# Patient Record
Sex: FEMALE | Race: WHITE | NOT HISPANIC OR LATINO | URBAN - METROPOLITAN AREA
[De-identification: names, ages, dates, MRNs, and addresses within clinical notes are randomized per-mention and may not be internally consistent; named-entity substitution may affect disease eponyms.]

---
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
## 2024-02-05 ENCOUNTER — APPOINTMENT (OUTPATIENT)
Dept: ORTHOPEDIC SURGERY | Facility: CLINIC | Age: 19
End: 2024-02-05
Payer: COMMERCIAL

## 2024-02-05 VITALS — WEIGHT: 140 LBS | HEIGHT: 65 IN | BODY MASS INDEX: 23.32 KG/M2

## 2024-02-05 DIAGNOSIS — M54.50 LOW BACK PAIN, UNSPECIFIED: ICD-10-CM

## 2024-02-05 PROCEDURE — 73502 X-RAY EXAM HIP UNI 2-3 VIEWS: CPT

## 2024-02-05 PROCEDURE — 72100 X-RAY EXAM L-S SPINE 2/3 VWS: CPT

## 2024-02-05 PROCEDURE — 99213 OFFICE O/P EST LOW 20 MIN: CPT

## 2024-02-05 NOTE — ASSESSMENT
[FreeTextEntry1] : she has chronic hip pain with recent exacerbation, has failed prior therapy  will get hip mri evaluate labral tear  f/u after mri

## 2024-02-05 NOTE — HISTORY OF PRESENT ILLNESS
[Intermittent] : intermittent [de-identified] :  02/05/2024:  New patient here for right hip pain. hx of hip pan since she was 7 years old. Has history of snapping hip, joint laxity. She also notes history of lumbar issues. states she was scheduled by pain mgt for lesi last summer but cancelled because they did not send pain meds prior to the procedure. Present complaints  Was lifting at the gym  3 weeks ago, pain in the right hip/groin after lift just never went away. Hurts when she externally rotates. 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

## 2024-02-05 NOTE — PHYSICAL EXAM
[Right] : right hip [5___] : adduction 5[unfilled]/5 [] : not mildly antalgic [de-identified] : click noted with rotation

## 2024-02-09 ENCOUNTER — RESULT REVIEW (OUTPATIENT)
Age: 19
End: 2024-02-09

## 2024-02-09 ENCOUNTER — APPOINTMENT (OUTPATIENT)
Dept: MRI IMAGING | Facility: CLINIC | Age: 19
End: 2024-02-09

## 2024-02-16 ENCOUNTER — APPOINTMENT (OUTPATIENT)
Dept: ORTHOPEDIC SURGERY | Facility: CLINIC | Age: 19
End: 2024-02-16
Payer: COMMERCIAL

## 2024-02-16 DIAGNOSIS — M24.851 OTHER SPECIFIC JOINT DERANGEMENTS OF RIGHT HIP, NOT ELSEWHERE CLASSIFIED: ICD-10-CM

## 2024-02-16 PROCEDURE — 99213 OFFICE O/P EST LOW 20 MIN: CPT

## 2024-02-16 NOTE — PHYSICAL EXAM
[Right] : right hip [5___] : adduction 5[unfilled]/5 [] : not mildly antalgic [de-identified] : click noted with rotation

## 2024-02-16 NOTE — HISTORY OF PRESENT ILLNESS
[Cough] : cough [___ Weeks ago] :  [unfilled] weeks ago [Congestion] : congestion [Wheezing] : no wheezing [Shortness Of Breath] : no shortness of breath [Earache] : no earache [de-identified] :  02/05/2024:  New patient here for right hip pain. hx of hip pan since she was 7 years old. Has history of snapping hip, joint laxity. She also notes history of lumbar issues. states she was scheduled by pain mgt for lesi last summer but cancelled because they did not send pain meds prior to the procedure. Present complaints  Was lifting at the gym  3 weeks ago, pain in the right hip/groin after lift just never went away. Hurts when she externally rotates. 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

## 2024-04-19 ENCOUNTER — APPOINTMENT (OUTPATIENT)
Dept: ORTHOPEDIC SURGERY | Facility: CLINIC | Age: 19
End: 2024-04-19
Payer: COMMERCIAL

## 2024-04-19 VITALS — HEIGHT: 65 IN | BODY MASS INDEX: 23.32 KG/M2 | WEIGHT: 140 LBS

## 2024-04-19 DIAGNOSIS — S76.011A STRAIN OF MUSCLE, FASCIA AND TENDON OF RIGHT HIP, INITIAL ENCOUNTER: ICD-10-CM

## 2024-04-19 DIAGNOSIS — M51.26 OTHER INTERVERTEBRAL DISC DISPLACEMENT, LUMBAR REGION: ICD-10-CM

## 2024-04-19 PROCEDURE — 99213 OFFICE O/P EST LOW 20 MIN: CPT

## 2024-07-08 ENCOUNTER — APPOINTMENT (OUTPATIENT)
Dept: ORTHOPEDIC SURGERY | Facility: CLINIC | Age: 19
End: 2024-07-08
Payer: COMMERCIAL

## 2024-07-08 ENCOUNTER — NON-APPOINTMENT (OUTPATIENT)
Age: 19
End: 2024-07-08

## 2024-07-08 VITALS
BODY MASS INDEX: 23.32 KG/M2 | HEART RATE: 91 BPM | DIASTOLIC BLOOD PRESSURE: 80 MMHG | SYSTOLIC BLOOD PRESSURE: 118 MMHG | HEIGHT: 65 IN | WEIGHT: 140 LBS

## 2024-07-08 PROCEDURE — 99204 OFFICE O/P NEW MOD 45 MIN: CPT

## 2024-07-08 PROCEDURE — 73522 X-RAY EXAM HIPS BI 3-4 VIEWS: CPT

## 2024-07-10 ENCOUNTER — RESULT REVIEW (OUTPATIENT)
Age: 19
End: 2024-07-10

## 2024-07-11 ENCOUNTER — OUTPATIENT (OUTPATIENT)
Dept: OUTPATIENT SERVICES | Facility: HOSPITAL | Age: 19
LOS: 1 days | End: 2024-07-11

## 2024-07-11 ENCOUNTER — APPOINTMENT (OUTPATIENT)
Dept: INTERVENTIONAL RADIOLOGY/VASCULAR | Facility: CLINIC | Age: 19
End: 2024-07-11
Payer: COMMERCIAL

## 2024-07-11 DIAGNOSIS — M25.551 PAIN IN RIGHT HIP: ICD-10-CM

## 2024-07-11 PROCEDURE — 27093 INJECTION FOR HIP X-RAY: CPT | Mod: RT

## 2024-07-11 PROCEDURE — 73525 CONTRAST X-RAY OF HIP: CPT | Mod: 26,RT

## 2024-07-16 ENCOUNTER — APPOINTMENT (OUTPATIENT)
Dept: ORTHOPEDIC SURGERY | Facility: CLINIC | Age: 19
End: 2024-07-16
Payer: COMMERCIAL

## 2024-07-16 DIAGNOSIS — M25.551 PAIN IN RIGHT HIP: ICD-10-CM

## 2024-07-16 PROCEDURE — 99214 OFFICE O/P EST MOD 30 MIN: CPT

## 2024-07-30 ENCOUNTER — APPOINTMENT (OUTPATIENT)
Dept: ORTHOPEDIC SURGERY | Facility: CLINIC | Age: 19
End: 2024-07-30
Payer: COMMERCIAL

## 2024-07-30 VITALS
DIASTOLIC BLOOD PRESSURE: 77 MMHG | HEART RATE: 85 BPM | SYSTOLIC BLOOD PRESSURE: 117 MMHG | HEIGHT: 65 IN | BODY MASS INDEX: 23.32 KG/M2 | WEIGHT: 140 LBS

## 2024-07-30 PROCEDURE — 99214 OFFICE O/P EST MOD 30 MIN: CPT

## 2024-07-30 NOTE — HISTORY OF PRESENT ILLNESS
[de-identified] : 7/30/24: Patient here for follow-up right hip pain.  States she does have back pain that is worse in the lower area of her back.  Does occasionally have pain that radiates down her leg.  Does get some numbness and tingling at times.  Denies red flag symptoms.  Did previously see an orthopedist for this and was doing physical therapy which does provide some relief.  Taking Aleve for the pain.  Does feel this is limiting her.  7/16/24: Patient here for follow-up right hip pain.  States steroid injection felt great for a few hours but then felt worse afterwards.  Has been doing PT for months but does not feel like pain is going away.  Despite the pain is limiting her.  Pain is mostly deep groin area.  Feels it is hard to stand for long periods of time at her job.  Feels the pain has gotten worse from a few months ago.  7/8/24: Patient presents with hip knee pain right worse than left.  Is been on for many years.  She grope dancing.  Has been seeing another orthopedist who recommended physical therapy.  It helps somewhat but it does not last.  Tried taking Advil which does not provide much relief.  Occasionally gets pain that radiates to her knees but is mostly deep in her hip.  Denies numbness tingling.  Does feel her hip snaps at times.  PMH-asthma, PCOS

## 2024-07-30 NOTE — DISCUSSION/SUMMARY
[de-identified] : Lumbar radiculopathy  Extensive discussion of the natural history of this issue was had with the patient.  We discussed the treatment options focusing on conservative therapy which includes anti-inflammatories, physical therapy/home exercise, & activity modification.   A prescription for meloxicam with the appropriate warnings for GI, cardiac, and renal side effects was given to the patient.  Patient was instructed not to use any other NSAIDs with this medication. Recommend patient follow-up with PM&R to discuss further treatments for her lumbar spine.  The patient's current medication management of their orthopedic diagnosis was reviewed today: (1) We discussed a comprehensive treatment plan that included possible pharmaceutical management involving the use of prescription strength medications including but not limited to options such as oral Ibuprofen 400mg QID, once daily Meloxicam 15 mg, or 500-650 mg Tylenol versus over the counter oral medications and topical prescription NSAID Pennsaid vs over the counter Voltaren gel. (2) There is a moderate risk of morbidity with further treatment, especially from use of prescription strength medications and possible side effects of these medications which include upset stomach with oral medications, skin reactions to topical medications and cardiac/renal issues with long term use. (3) I recommended that the patient follow-up with their medical physician to discuss any significant specific potential issues with long term medication use such as interactions with current medications or with exacerbation of underlying medical comorbidities. (4) The benefits and risks associated with use of injectable, oral or topical, prescription and over the counter anti-inflammatory medications were discussed with the patient. The patient voiced understanding of the risks including but not limited to bleeding, stroke, kidney dysfunction, heart disease, and were referred to the black box warning label for further information.

## 2024-07-30 NOTE — PHYSICAL EXAM
[de-identified] : 7/30/24: MRI right hip 7/17/2024-mild right gluteus minimus tendinosis, disc degeneration at lumbosacral junction Prior lumbar x-rays at [facility] and MRIs of lumbar spine at [facility] [place] reviewed-degenerative disc disease around L5-S1 with stenosis 7/8/24: AP pelvis lateral bilateral hips-joint space well-preserved, no abnormalities MRI right hip 2/9/2024-small bilateral hip joint effusions, exam otherwise unremarkable [de-identified] : Bilateral hips Pain with resisted hip flexion, adduction, knee flexion on right No pain with resisted abduction Pain with FADIR right more than left  Lumbar spine Palpation: Tender to palpation at paraspinal muscles Motor: 5/5 L2-S1 Sensory: 2/2 L2-S1 Straight leg raise: Positive bilaterally Clonus: < 5 beats

## 2024-08-09 ENCOUNTER — APPOINTMENT (OUTPATIENT)
Dept: PHYSICAL MEDICINE AND REHAB | Facility: CLINIC | Age: 19
End: 2024-08-09

## 2024-08-09 PROBLEM — Z87.39 HISTORY OF SCOLIOSIS: Status: RESOLVED | Noted: 2024-08-09 | Resolved: 2024-08-09

## 2024-08-09 PROBLEM — E28.2 PCOS (POLYCYSTIC OVARIAN SYNDROME): Status: RESOLVED | Noted: 2024-08-09 | Resolved: 2024-08-09

## 2024-08-09 PROCEDURE — 99204 OFFICE O/P NEW MOD 45 MIN: CPT | Mod: GC

## 2024-08-09 PROCEDURE — G2211 COMPLEX E/M VISIT ADD ON: CPT | Mod: NC

## 2024-08-09 RX ORDER — MELOXICAM 15 MG/1
15 TABLET ORAL DAILY
Qty: 30 | Refills: 1 | Status: COMPLETED | COMMUNITY
Start: 2024-07-30 | End: 2024-08-09

## 2024-08-09 NOTE — PHYSICAL EXAM
[FreeTextEntry1] : PE: Constitutional: In NAD, calm and cooperative MSK (Back)  Inspection: no gross swelling identified  Palpation: Tenderness of the bilateral lower lumbar paraspinals  ROM: Pain at end lumbar extension=flexion  Strength: 5/5 strength in bilateral lower extremities  Reflexes: 2+ Patella reflex bilaterally, 2+ Achilles reflex bilaterally, negative clonus bilaterally  Sensation: Intact to light touch in bilateral lower extremities Special tests: SLR: positive bilaterally GERARDO: negative bilaterally FADIR: negative bilaterally Facet loading: negative bilaterally

## 2024-08-09 NOTE — DATA REVIEWED
[FreeTextEntry1] : MR L Spine 2023 ZP rad reviewed and interpreted by me: L4-5 disc herniation seen with impingement of L5 nerve roots  Patient Name: Nataliya West Patient Phone Number: [phone] Patient ID: [id number] : 2005 Date of Exam: 06/15/2023 R. Phys. Name: Joseph Serna R. Phys. Address: [address] [place] [address], [facility] R. Phys. Phone: [phone] MRI-LUMBAR SPINE NON CONTRAST  HISTORY: M54.41 Lower back pain with right sciatica R20.2 Upper and Lower Extremity Pins and Needles M54.42 Lower back pain with left sciatica M62.830 Spasm of back muscles R20.0 Numbness Lower/Upper Extremity  TECHNIQUE: MR imaging of the lumbar spine was performed without contrast on a 3.0 Carolyn high-field wide-bore magnet.  COMPARISON: MRI lumbar spine MRI dated 2019  FINDINGS:  SEGMENTATION: Normal.  ALIGNMENT: Minimal thoracolumbar dextroscoliosis.  BONES: Vertebral body heights are maintained. Marrow signal is within normal limits.  CONUS: Normal in signal and morphology.  CANAL: No congenital narrowing of the spinal canal.  DISCS: See details below.  L1-L2: There is no disc bulge, herniation, or stenosis.  L2-L3: There is no disc bulge, herniation, or stenosis.  L3-L4: There is no disc bulge, herniation, or stenosis.  L4-L5: There is a progressive right paracentral disc herniation with annular tear impressing upon the thecal sac resulting in mild to moderate central stenosis. There is lateral recess stenosis, worse on the right with impingement of the right L5 nerve roots. The neural foramina are patent.  L5-S1: There is no disc bulge, herniation, or stenosis.  The visualized abdominal and pelvic structures are unremarkable.  IMPRESSION:   L4-L5: There is a progressive right paracentral disc herniation with annular tear impressing upon the thecal sac resulting in mild to moderate central stenosis. There is lateral recess stenosis, worse on the right with impingement of the right L5 nerve roots. The neural foramina are patent.  Signed by: Dio Carver MD Signed Date: 2023 1:26 PM EDT    SIGNED BY: Dio Carver M.D., Ext. [phone] 01:26 PM

## 2024-08-09 NOTE — HISTORY OF PRESENT ILLNESS
[FreeTextEntry1] : Ms. YOLI DYER is a 19-year-old female who presents with low back pain.   Location: bilateral lower back pain Onset: pain since 2018 and worsened in Jan 2024 Provocation/Palliative: Lifting, standing, walking worsen pain, better with laying down Quality: aching Radiation:  intermittently radiates into foot R>L to the bottom of the foot  Severity: moderate pain that can increase to severe after long hours of work Timing: constant   Intermittent feet numbness R>L. Denies any associated leg weakness. Denies any loss of bowel/bladder control or any groin numbness. Previous medications trialed: Aleve, tylenol, advil, meloxicam, on steroids intermittently for asthma/allergies Previous procedures relevant to complaint: none Conservative therapy tried?: still in PT, doing aquatic therapies, chiropractor but not improved

## 2024-08-09 NOTE — ASSESSMENT
- Hold BP meds due to septic shock  - Will resume medication as indicated   [FreeTextEntry1] : Ms. Nataliya West is a 19 year old female presenting for worsening chronic lower back pain. Patient's been consistent with lumbar radiculopathy of the L5 nerve roots. Denies any red flag signs.  Will recommend: -2023 L spine Mri reviewed -continue aquatic PT -ordered L spine Mri since pain worsened after most recent Mri with concern of possible worsening of prior L disc herniation -prescription for Celebrex 100mg, 1 tablet 1-2 times daily as needed for lower back pain.  Patient advised on cardiac/gi/renal side effects. Patient encouraged to take medication with food and not with other NSAIDs.    Return after MRI. Patient aware of red flag signs including any changes to their bowel/bladder control, groin numbness or new weakness. Patient knows to seek immediate attention by calling 911 or going to nearest ER if these symptoms appear.  This patient is being managed for a complex chronic pain that requires ongoing medical management. The nature of this condition requires a longitudinal relationship and monitoring over time for appropriate treatment.

## 2024-08-21 ENCOUNTER — APPOINTMENT (OUTPATIENT)
Dept: PHYSICAL MEDICINE AND REHAB | Facility: CLINIC | Age: 19
End: 2024-08-21
Payer: COMMERCIAL

## 2024-08-21 VITALS
WEIGHT: 140 LBS | RESPIRATION RATE: 15 BRPM | HEART RATE: 80 BPM | DIASTOLIC BLOOD PRESSURE: 80 MMHG | BODY MASS INDEX: 23.32 KG/M2 | HEIGHT: 65 IN | SYSTOLIC BLOOD PRESSURE: 127 MMHG

## 2024-08-21 DIAGNOSIS — M54.16 RADICULOPATHY, LUMBAR REGION: ICD-10-CM

## 2024-08-21 DIAGNOSIS — M51.26 OTHER INTERVERTEBRAL DISC DISPLACEMENT, LUMBAR REGION: ICD-10-CM

## 2024-08-21 PROCEDURE — 99214 OFFICE O/P EST MOD 30 MIN: CPT

## 2024-08-21 PROCEDURE — G2211 COMPLEX E/M VISIT ADD ON: CPT | Mod: NC

## 2024-08-21 NOTE — ASSESSMENT
[FreeTextEntry1] : Ms. Nataliya West is a 19 year old female presenting for worsening chronic lower back pain. Patient's been consistent with lumbar radiculopathy of the L5 nerve roots as confirmed on new MRI L Spine showing worsening herniation. Denies any red flag signs. Will recommend: - Discussed with patient the risks (including but not limited to bleeding, elevated blood sugar, allergic reaction, infection, nerve damage, etc), benefits and alternatives to an epidural steroid injection for which patient understands and would like to proceed. Will plan on a bilateral L4-5 TFESI.  -continue aquatic PT   Return for procedure. Patient aware of red flag signs including any changes to their bowel/bladder control, groin numbness or new weakness. Patient knows to seek immediate attention by calling 911 or going to nearest ER if these symptoms appear.  This patient is being managed for a complex chronic pain that requires ongoing medical management. The nature of this condition requires a longitudinal relationship and monitoring over time for appropriate treatment

## 2024-08-21 NOTE — PHYSICAL EXAM
[FreeTextEntry1] : PE: Constitutional: In NAD, calm and cooperative MSK (Back)  Inspection: no gross swelling identified  Palpation: Tenderness of the bilateral lower lumbar paraspinals  ROM: Pain at end lumbar extension=flexion  Strength: 5/5 strength in bilateral lower extremities  Reflexes: 2+ Patella reflex bilaterally, 2+ Achilles reflex bilaterally, negative clonus bilaterally  Sensation: Intact to light touch in bilateral lower extremities Special tests: SLR: positive bilaterally GERARDO: negative bilaterally FADIR: negative bilaterally Facet loading: negative bilaterally.

## 2024-08-21 NOTE — HISTORY OF PRESENT ILLNESS
[FreeTextEntry1] : Ms. YOLI DYER is a 19 year old female who presents for follow up. At last visit, she was continued on Aquatic PT, ordered a new MRI L spine and given Celebrex. She says the pain has been getting worse.  She says the celebrex did not help too much.    Location: bilateral lower back pain Onset: pain since 2018 and worsened in Jan 2024 Provocation/Palliative: Lifting, standing, walking worsen pain, better with laying down Quality: aching Radiation: intermittently radiates into foot R>L to the bottom of the foot Severity: moderate pain that can increase to severe after long hours of work Timing: constant  No bowel/bladder changes. No groin numbness.

## 2024-08-21 NOTE — DATA REVIEWED
[FreeTextEntry1] : A1C 2023 reviewed CMP 2023 reviewed  Patient Name: Nataliya West Patient Phone Number: [phone] Patient ID: [id number] : 2005 Date of Exam: 2024 R. Phys. Name: Akshat Cid RRuth PhysRuth Address: [address] [place] [address] R. Phys. Phone: [phone] EXAM: MRI-LUMBAR SPINE NON CONTRAST  HISTORY: M54.41 Lower back pain with right sciatica   TECHNIQUE: Axial and sagittal multi-weighted images of the lumbar spine were obtained on a 1.5 Carolyn magnet.  COMPARISON: 6.15.23.  FINDINGS:  There is no fracture, bone marrow edema or suspicious marrow replacing lesion.  Spinal alignment is normal.  Disc desiccation at L4-L5. The remaining intervertebral discs are normal in height and signal.  The conus medullaris terminates at L1.  L1-L2: No disc bulge, spinal canal or foraminal stenosis. L2-L3: No disc bulge, spinal canal or foraminal stenosis L3-L4: No disc bulge, spinal canal or foraminal stenosis L4-L5: Disc bulge with right paracentral annular fissure and disc protrusion. Moderate right greater than left lateral recess narrowing with impingement of descending L5 nerve roots, worsened from prior. No foraminal narrowing. L5-S1: No disc bulge, spinal canal or foraminal narrowing  IMPRESSION:  L4-L5: Disc bulge with right paracentral annular fissure and disc protrusion. Moderate right greater than left lateral recess narrowing with impingement of descending L5 nerve roots, worsened from prior.  No fracture  Signed by: Piotr Blandon Signed Date: 2024 1:15 PM EDT    SIGNED BY: Piotr Blandon M.D., Ext. [phone] 01:15 PM

## 2024-09-05 ENCOUNTER — APPOINTMENT (OUTPATIENT)
Dept: PHYSICAL MEDICINE AND REHAB | Facility: AMBULATORY SURGERY CENTER | Age: 19
End: 2024-09-05
Payer: COMMERCIAL

## 2024-09-05 PROCEDURE — 64483 NJX AA&/STRD TFRM EPI L/S 1: CPT | Mod: 50

## 2024-09-05 NOTE — PROCEDURE
[de-identified] : Lumbar Transforaminal Epidural Steroid Injection Under Fluoroscopic Guidance    Attending: Akshat Cid DO  PREOPERATIVE DIAGNOSIS: Lumbar Radiculopathy POSTOPERATIVE DIAGNOSIS: same  SEDATION: As per Anesthesia   PROCEDURE PERFORMED:  Lumbar Transforaminal Epidural Steroid Injection at the bilateral L4-5 level   ESTIMATED BLOOD LOSS:  None FLUOROSCOPY WAS USED.    PROCEDURE AND FINDINGS:   Patient was greeted in the pre-procedure holding area. The risk, benefits and alternatives to the procedure were again reviewed with the patient and written informed consent was placed in the chart. They were taken to the procedure room and positioned prone on the fluoroscopy table.  Prior to the procedure a time out was completed, verifying correct patient, procedure, and site.     An AP fluoroscopic  film was taken to identify the vertebral bodies, pedicles and superior articulating processes of interest. The fluoroscope was then obliqued ipsilaterally until the foramen was optimally visualized. The skin was prepped with chlorhexidine and draped in the usual sterile fashion. The skin and subcutaneous tissue overlying the aforementioned anatomic targets were anesthetized using a 27-gauge 1-1/2 inch needle with 1% preservative-free lidocaine for a total volume of 6 mls.     Then a 22-gauge 3.5inch Quincke spinal needle was advanced under fluoroscopic guidance using an oblique view just inferior to the pedicle(s) of interest. Then, utilizing AP and lateral fluoroscopic views, the position of the needle tip was confirmed to be within the neural foramen. After negative aspiration, 2 mls of contrast was injected under AP view at each level and confirmed adequate spread along the nerve root and in the epidural space. There was no evidence of intravascular uptake or intrathecal spread on imaging.    At this point, after negative aspiration, a total of 2mL volume of treatment injectate, consisting of 0.5mL of dexamethasone 10mg/mL, and 1.5 mL of Preservative Free Normal Saline was injected easily.  The needle was then flushed with 1 ml of saline and removed. The needle insertion site was dressed appropriately.    Patient was taken to the recovery room where they were monitored for a brief period of time. They tolerated the procedure well and were discharged home in stable condition with post procedural instructions. Patient was instructed to follow up in clinic in 2 weeks.

## 2024-09-13 ENCOUNTER — APPOINTMENT (OUTPATIENT)
Dept: PHYSICAL MEDICINE AND REHAB | Facility: CLINIC | Age: 19
End: 2024-09-13
Payer: COMMERCIAL

## 2024-09-13 VITALS
DIASTOLIC BLOOD PRESSURE: 76 MMHG | WEIGHT: 140 LBS | SYSTOLIC BLOOD PRESSURE: 115 MMHG | RESPIRATION RATE: 15 BRPM | BODY MASS INDEX: 23.9 KG/M2 | HEIGHT: 64 IN | HEART RATE: 82 BPM

## 2024-09-13 PROCEDURE — G2211 COMPLEX E/M VISIT ADD ON: CPT | Mod: NC

## 2024-09-13 PROCEDURE — 99214 OFFICE O/P EST MOD 30 MIN: CPT

## 2024-09-13 NOTE — PHYSICAL EXAM
[FreeTextEntry1] : PE: Constitutional: In NAD, calm and cooperative MSK (Back/hips)  Inspection: no gross swelling identified, no swelling or erythema around injection sites  Palpation: No significant tenderness of the bilateral lower lumbar paraspinals, mild TTP over R>L GTB  ROM: Pain at end lumbar flexion>extension and with R>L hip ER>IR  Strength: 5/5 strength in bilateral lower extremities  Reflexes: 2+ Patella reflex bilaterally, 2+ Achilles reflex bilaterally, negative clonus bilaterally  Sensation: Intact to light touch in bilateral lower extremities Special tests: SLR: positive bilaterally GERARDO: positive bilaterally FADIR: negative bilaterally Facet loading: negative bilaterally.

## 2024-09-13 NOTE — ASSESSMENT
[FreeTextEntry1] : Ms. Nataliya West is a 19 year old female presenting for worsening chronic lower back pain. Patient's been consistent with lumbar radiculopathy of the L5 nerve roots as confirmed on new MRI L Spine showing worsening herniation. She has a bilateral L4-5 TFESI without significant relief. Denies any red flag signs. Will recommend: - Discussed with patient the risks (including but not limited to bleeding, elevated blood sugar, allergic reaction, infection, nerve damage, etc), benefits and alternatives to a repeat epidural steroid injection for which patient understands but would like to hold off for now. - Will refer patient to Ortho Spine given persistent radicular pain despite BEBETO -continue aquatic PT - Patient deferred other oral medications at t his time - Patient to get another opinion for her hips as well  Return after Ortho evaluations. Patient aware of red flag signs including any changes to their bowel/bladder control, groin numbness or new weakness. Patient knows to seek immediate attention by calling 911 or going to nearest ER if these symptoms appear.  This patient is being managed for a complex chronic pain that requires ongoing medical management. The nature of this condition requires a longitudinal relationship and monitoring over time for appropriate treatment.

## 2024-09-13 NOTE — HISTORY OF PRESENT ILLNESS
[FreeTextEntry1] : Ms. YOLI DYER is a 19 year old female who presents for follow up. At last visit, she was ordered a bilateral L4-5 TFESI for which she had done on 9/5/24. She says she only got about a day worth of relief from the procedure until pain came back. Pain is the same as before. She is also complaining of bilateral hip pain, R>L, again similar to prior.    Location: bilateral lower back pain, bilateral hips R>L Onset: pain since 2018 and worsened in Jan 2024, no inciting events Provocation/Palliative: Lifting, standing, walking worsen pain, better with laying down Quality: aching Radiation: intermittently radiates into foot R>L to the bottom of the foot Severity: moderate pain that can increase to severe after long hours of work Timing: constant   No bowel/bladder changes. No groin numbness.

## 2024-09-19 ENCOUNTER — APPOINTMENT (OUTPATIENT)
Dept: ORTHOPEDIC SURGERY | Facility: CLINIC | Age: 19
End: 2024-09-19
Payer: COMMERCIAL

## 2024-09-19 VITALS
WEIGHT: 140 LBS | SYSTOLIC BLOOD PRESSURE: 114 MMHG | HEART RATE: 84 BPM | DIASTOLIC BLOOD PRESSURE: 77 MMHG | BODY MASS INDEX: 23.9 KG/M2 | HEIGHT: 64 IN

## 2024-09-19 DIAGNOSIS — S76.011A STRAIN OF MUSCLE, FASCIA AND TENDON OF RIGHT HIP, INITIAL ENCOUNTER: ICD-10-CM

## 2024-09-19 DIAGNOSIS — M25.551 PAIN IN RIGHT HIP: ICD-10-CM

## 2024-09-19 DIAGNOSIS — M24.851 OTHER SPECIFIC JOINT DERANGEMENTS OF RIGHT HIP, NOT ELSEWHERE CLASSIFIED: ICD-10-CM

## 2024-09-19 DIAGNOSIS — M54.16 RADICULOPATHY, LUMBAR REGION: ICD-10-CM

## 2024-09-19 DIAGNOSIS — M51.26 OTHER INTERVERTEBRAL DISC DISPLACEMENT, LUMBAR REGION: ICD-10-CM

## 2024-09-19 PROCEDURE — 99204 OFFICE O/P NEW MOD 45 MIN: CPT

## 2024-09-19 PROCEDURE — 72100 X-RAY EXAM L-S SPINE 2/3 VWS: CPT

## 2024-09-19 PROCEDURE — 99214 OFFICE O/P EST MOD 30 MIN: CPT | Mod: 25

## 2024-09-19 PROCEDURE — 99214 OFFICE O/P EST MOD 30 MIN: CPT

## 2024-09-19 RX ORDER — OMEPRAZOLE 40 MG/1
40 CAPSULE, DELAYED RELEASE ORAL
Refills: 0 | Status: ACTIVE | COMMUNITY

## 2024-09-19 RX ORDER — DUPILUMAB 300 MG/2ML
300 INJECTION, SOLUTION SUBCUTANEOUS
Refills: 0 | Status: ACTIVE | COMMUNITY

## 2024-09-19 RX ORDER — FEXOFENADINE HYDROCHLORIDE 180 MG/1
180 TABLET ORAL
Refills: 0 | Status: ACTIVE | COMMUNITY

## 2024-09-19 RX ORDER — HYDROXYZINE HYDROCHLORIDE 25 MG/1
25 TABLET ORAL
Refills: 0 | Status: ACTIVE | COMMUNITY

## 2024-09-19 RX ORDER — FLUTICASONE FUROATE, UMECLIDINIUM BROMIDE AND VILANTEROL TRIFENATATE 200; 62.5; 25 UG/1; UG/1; UG/1
200-62.5-25 POWDER RESPIRATORY (INHALATION)
Refills: 0 | Status: ACTIVE | COMMUNITY

## 2024-09-19 NOTE — ADDENDUM
[FreeTextEntry1] : Documented by Anaid Pascal acting as a scribe for Dr. Reeves and Yamil Baker PA-C on 09/19/2024. All medical record entries made by the Scribe were at my, Dr. Reeves, and Yamil Baker's, direction and personally dictated by me on 09/19/2024. I have reviewed the chart and agree that the record accurately reflects my personal performance of the history, physical exam, procedure and imaging.

## 2024-09-19 NOTE — DISCUSSION/SUMMARY
[de-identified] : We had a thorough discussion regarding the nature of her pain, the pathophysiology, as well as all treatment options. I discussed operative and non-operative treatment modalities. Recommend patient follow up with a rheumatologist to reassess her symptoms. Considering the patient's current presentation of pain, physical exam, and radiographs an MRI arthrogram is indicated at this time. A prescription for this was given to the patient. We will go over the MRI arthrogram results with her upon obtaining the results in the office and advise her of further treatment options. She agrees with the above plan and all questions were answered.

## 2024-09-19 NOTE — PHYSICAL EXAM
[de-identified] : General: Well appearing, no acute distress Neurologic: A&Ox3, No focal deficits Head: NCAT without abrasions, lacerations, or ecchymosis to head, face, or scalp Eyes: No scleral icterus, no gross abnormalities Respiratory: Equal chest wall expansion bilaterally, no accessory muscle use Lymphatic: No lymphadenopathy palpated Skin: Warm and dry Psychiatric: Normal mood and affect  Examination of the Right hip reveals no obvious deformity or leg length discrepancy. There is no swelling noted. The patient is nontender to palpation over the greater trochanter, groin, and IT band. The patients range of motion is to 110 degrees of hip flexion, 40 degrees of abduction, 40 degrees of abduction, 20 degrees of adduction, and 35 degrees of external rotation causes palpable click likely over the iliopsoas.  Internal rotations deficit b/l. The patient has 5/5 strength to resisted hip flexion, abduction and adduction. The patient has anterior hip pain with GERARDO and positive FADIR Test. Positive Lyman Test. Positive resisted SLR test at 30 degrees of hip flexion (Stinchfield). Negative Piriformis Test. No SI joint instability. There is no pain with logrolling. The calf and thigh are soft and nontender bilaterally. The patient is grossly neurovascularly intact distally.   Examination of the Left hip reveals no obvious deformity or leg length discrepancy. There is no swelling noted. The patient is nontender to palpation over the greater trochanter, groin, and IT band. The patients range of motion is to 110 degrees of hip flexion, 40 degrees of abduction, 40 degrees of abduction, 20 degrees of adduction, and 35 degrees of external rotation. Internal rotations deficit b/l. Patient has 5/5 strength to resisted hip flexion, abduction and adduction. The patient has a negative GERARDO and positive FADIR Test. Positive Lyman Test. Positive resisted SLR test at 30 degrees of hip flexion (Stinchfield). Negative Piriformis Test. No SI joint instability. There is no pain with logrolling. The calf and thigh are soft and nontender bilaterally. The patient is grossly neurovascularly intact distally.  [de-identified] : MRI [facility] Date of Exam: 07/17/2024 MRI-RIGHT HIP NON CONTRAST  IMPRESSION: Mild right gluteus minimus tendinosis, without tears.  Disc desiccation/degeneration of the intervertebral discs at the lumbosacral junction.  Date of Exam: 08/14/2024 EXAM: MRI-LUMBAR SPINE NON CONTRAST  IMPRESSION: L4-L5: Disc bulge with right paracentral annular fissure and disc protrusion. Moderate right greater than left lateral recess narrowing with impingement of descending L5 nerve roots, worsened from prior.  No fracture

## 2024-09-19 NOTE — HISTORY OF PRESENT ILLNESS
[de-identified] : YOLI DYER is a 19 year old female being seen for bilateral hip pain R>>L. She states she has been seeing Dr. Cid and Dr. Muller for her back pain and she has seen Dr. Gore for her hip pain. She states she has been experiencing hip pain since January 2024. 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 She complains of pain and tightness in her R hip. She reports she feels pain in the lateral aspect of her right hip and states it radiates into her groin. States left hip is incomparable to her R hip pain and symptoms. She reports having epidural injection in her lumbar spine and an intra-articular injection into her right hip. She states neither injection provided her with any relief of symptoms. She presents today with MRIs of her lumbar spine and right hip from [facility]. Of note, states that she was following with an allergist who recommended her take Dupixent. Her first injection was yesterday.

## 2024-09-19 NOTE — CONSULT LETTER
[Dear  ___] : Dear  [unfilled], [Consult Letter:] : I had the pleasure of evaluating your patient, [unfilled]. [Please see my note below.] : Please see my note below. [Sincerely,] : Sincerely, [FreeTextEntry3] : Dr. Camilo Reeves

## 2024-09-19 NOTE — PHYSICAL EXAM
[de-identified] : General: Well appearing, no acute distress Neurologic: A&Ox3, No focal deficits Head: NCAT without abrasions, lacerations, or ecchymosis to head, face, or scalp Eyes: No scleral icterus, no gross abnormalities Respiratory: Equal chest wall expansion bilaterally, no accessory muscle use Lymphatic: No lymphadenopathy palpated Skin: Warm and dry Psychiatric: Normal mood and affect  Examination of the Right hip reveals no obvious deformity or leg length discrepancy. There is no swelling noted. The patient is nontender to palpation over the greater trochanter, groin, and IT band. The patients range of motion is to 110 degrees of hip flexion, 40 degrees of abduction, 40 degrees of abduction, 20 degrees of adduction, and 35 degrees of external rotation causes palpable click likely over the iliopsoas.  Internal rotations deficit b/l. The patient has 5/5 strength to resisted hip flexion, abduction and adduction. The patient has anterior hip pain with GERARDO and positive FADIR Test. Positive Lyman Test. Positive resisted SLR test at 30 degrees of hip flexion (Stinchfield). Negative Piriformis Test. No SI joint instability. There is no pain with logrolling. The calf and thigh are soft and nontender bilaterally. The patient is grossly neurovascularly intact distally.   Examination of the Left hip reveals no obvious deformity or leg length discrepancy. There is no swelling noted. The patient is nontender to palpation over the greater trochanter, groin, and IT band. The patients range of motion is to 110 degrees of hip flexion, 40 degrees of abduction, 40 degrees of abduction, 20 degrees of adduction, and 35 degrees of external rotation. Internal rotations deficit b/l. Patient has 5/5 strength to resisted hip flexion, abduction and adduction. The patient has a negative GERARDO and positive FADIR Test. Positive Lyman Test. Positive resisted SLR test at 30 degrees of hip flexion (Stinchfield). Negative Piriformis Test. No SI joint instability. There is no pain with logrolling. The calf and thigh are soft and nontender bilaterally. The patient is grossly neurovascularly intact distally.  [de-identified] : MRI [facility] Date of Exam: 07/17/2024 MRI-RIGHT HIP NON CONTRAST  IMPRESSION: Mild right gluteus minimus tendinosis, without tears.  Disc desiccation/degeneration of the intervertebral discs at the lumbosacral junction.  Date of Exam: 08/14/2024 EXAM: MRI-LUMBAR SPINE NON CONTRAST  IMPRESSION: L4-L5: Disc bulge with right paracentral annular fissure and disc protrusion. Moderate right greater than left lateral recess narrowing with impingement of descending L5 nerve roots, worsened from prior.  No fracture

## 2024-09-19 NOTE — DISCUSSION/SUMMARY
[de-identified] : Very pleasant surgical conversation was conducted with this woman with regard to an L4-5 right discectomy.  She wishes to pursue maybe another course of a epidural injection continue home exercises excetra she is 19 years old she is in college she is also working.  I think this is a very reasonable aspect if patient wishes to pursue surgical management based upon 5-year history of right sided sciatica and L5 radiculopathy I think the above-mentioned surgical procedure before 5 right-sided discectomy can be reasonable to pursue surgical measures at this point in time based upon a 5-year history of gradual worsening L4-L5 T2 hypointensity and this protrusion is purely at this patient's discretion and quality of life.  Patient will follow-up in approximately 6 to 8 weeks for repeat surgical discussion ROM intact/normal shape/strength intact

## 2024-09-19 NOTE — HISTORY OF PRESENT ILLNESS
[de-identified] : YOLI DYER is a 19 year old female being seen for bilateral hip pain R>>L. She states she has been seeing Dr. Cid and Dr. Muller for her back pain and she has seen Dr. Gore for her hip pain. She states she has been experiencing hip pain since January 2024. 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 She complains of pain and tightness in her R hip. She reports she feels pain in the lateral aspect of her right hip and states it radiates into her groin. States left hip is incomparable to her R hip pain and symptoms. She reports having epidural injection in her lumbar spine and an intra-articular injection into her right hip. She states neither injection provided her with any relief of symptoms. She presents today with MRIs of her lumbar spine and right hip from [facility]. Of note, states that she was following with an allergist who recommended her take Dupixent. Her first injection was yesterday.

## 2024-09-19 NOTE — DISCUSSION/SUMMARY
[de-identified] : We had a thorough discussion regarding the nature of her pain, the pathophysiology, as well as all treatment options. I discussed operative and non-operative treatment modalities. Recommend patient follow up with a rheumatologist to reassess her symptoms. Considering the patient's current presentation of pain, physical exam, and radiographs an MRI arthrogram is indicated at this time. A prescription for this was given to the patient. We will go over the MRI arthrogram results with her upon obtaining the results in the office and advise her of further treatment options. She agrees with the above plan and all questions were answered.

## 2024-09-19 NOTE — PHYSICAL EXAM
[Normal] : Gait: normal [de-identified] : CONSTITUTIONAL: The patient is a very pleasant individual who is well-nourished and who appears stated age. PSYCHIATRIC: The patient is alert and oriented X 3 and in no apparent distress, and participates with orthopedic evaluation well. HEAD: Atraumatic and is nonsyndromic in appearance. EENT: No visible thyromegaly, EOMI. RESPIRATORY: Respiratory rate is regular, not dyspneic on examination. LYMPHATICS: There is no inguinal lymphadenopathy INTEGUMENTARY: Skin is clean, dry, and intact about the bilateral lower extremities and lumbar spine. VASCULAR: There is brisk capillary refill about the bilateral lower extremities. NEUROLOGIC: There are no pathologic reflexes. There is a decrease in sensation of the right lower extremities on manual  examination within an L5 distribution. Deep tendon reflexes are well maintained at 2+/4 of the bilateral lower extremities and are symmetric.. MUSCULOSKELETAL: There is no visible muscular atrophy. Manual motor strength is well maintained in the bilateral lower extremities, mild guarding. Range of motion of lumbar spine is well maintained, guarded. The patient ambulates in a non-myelopathic manner.  Positive tension sign and straight leg raise bilaterally. Quad extension, ankle dorsiflexion, EHL, plantar flexion, and ankle eversion are well preserved. Normal secondary orthopaedic exam of bilateral hips, however, this is associated with gluteal tendinopathy bilaterally right greater than left, greater trochanteric area, knees and ankles [de-identified] : MRI of the lumbar spine this is dated August 2024 demonstrates L4-L5 T2 hypointensity with a right paracentral L4-5 disc protrusion.  2 views of the lumbar spine reviewed on today's date shows mild loss of L4-5 disc space possible transitional type segment L5-S1 otherwise no acute osseous abnormalities lordosis well-maintained overall disc spaces well-maintained.

## 2024-09-19 NOTE — HISTORY OF PRESENT ILLNESS
[de-identified] : Very pleasant 19-year-old female presents for evaluation of chronic right sided radiculopathy patient states this has been going on for approximately 5 years she has had 1 as per Dr. Cid's note a foraminal epidural steroid injection she has had multiple courses of physical therapy and NSAIDs.  She was sent here for surgical discussion with regard to a right sided 4 5 discectomy.  He is also been diagnosed with gluteal tendinopathy.  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

## 2024-10-15 ENCOUNTER — APPOINTMENT (OUTPATIENT)
Dept: ORTHOPEDIC SURGERY | Facility: CLINIC | Age: 19
End: 2024-10-15
Payer: COMMERCIAL

## 2024-10-15 DIAGNOSIS — M25.851 OTHER SPECIFIED JOINT DISORDERS, RIGHT HIP: ICD-10-CM

## 2024-10-15 PROCEDURE — 99214 OFFICE O/P EST MOD 30 MIN: CPT

## 2024-10-24 ENCOUNTER — APPOINTMENT (OUTPATIENT)
Dept: ORTHOPEDIC SURGERY | Facility: CLINIC | Age: 19
End: 2024-10-24
Payer: COMMERCIAL

## 2024-10-24 PROCEDURE — 99214 OFFICE O/P EST MOD 30 MIN: CPT

## 2024-10-26 ENCOUNTER — APPOINTMENT (OUTPATIENT)
Dept: CT IMAGING | Facility: CLINIC | Age: 19
End: 2024-10-26

## 2024-10-31 ENCOUNTER — APPOINTMENT (OUTPATIENT)
Dept: ORTHOPEDIC SURGERY | Facility: CLINIC | Age: 19
End: 2024-10-31

## 2024-12-04 ENCOUNTER — LABORATORY RESULT (OUTPATIENT)
Age: 19
End: 2024-12-04

## 2024-12-04 ENCOUNTER — APPOINTMENT (OUTPATIENT)
Dept: RHEUMATOLOGY | Facility: CLINIC | Age: 19
End: 2024-12-04
Payer: COMMERCIAL

## 2024-12-04 VITALS
SYSTOLIC BLOOD PRESSURE: 129 MMHG | HEART RATE: 68 BPM | HEIGHT: 64 IN | BODY MASS INDEX: 23.56 KG/M2 | DIASTOLIC BLOOD PRESSURE: 82 MMHG | WEIGHT: 138 LBS | OXYGEN SATURATION: 100 %

## 2024-12-04 DIAGNOSIS — Z88.9 ALLERGY STATUS TO UNSPECIFIED DRUGS, MEDICAMENTS AND BIOLOGICAL SUBSTANCES: ICD-10-CM

## 2024-12-04 DIAGNOSIS — M25.50 PAIN IN UNSPECIFIED JOINT: ICD-10-CM

## 2024-12-04 PROCEDURE — 99204 OFFICE O/P NEW MOD 45 MIN: CPT

## 2024-12-04 PROCEDURE — 36415 COLL VENOUS BLD VENIPUNCTURE: CPT

## 2024-12-05 LAB
ALBUMIN SERPL ELPH-MCNC: 4.3 G/DL
ALP BLD-CCNC: 64 U/L
ALT SERPL-CCNC: 19 U/L
ANA SER IF-ACNC: NEGATIVE
ANION GAP SERPL CALC-SCNC: 14 MMOL/L
AST SERPL-CCNC: 19 U/L
BILIRUB SERPL-MCNC: 0.2 MG/DL
BUN SERPL-MCNC: 10 MG/DL
C3 SERPL-MCNC: 147 MG/DL
C4 SERPL-MCNC: 22 MG/DL
CALCIUM SERPL-MCNC: 9.6 MG/DL
CCP AB SER IA-ACNC: <8 U/ML
CHLORIDE SERPL-SCNC: 104 MMOL/L
CO2 SERPL-SCNC: 23 MMOL/L
CREAT SERPL-MCNC: 0.78 MG/DL
CRP SERPL-MCNC: 28 MG/L
DSDNA AB SER-ACNC: 1 IU/ML
EGFR: 111 ML/MIN/1.73M2
ENA RNP AB SER IA-ACNC: <0.2 AL
ENA SM AB SER IA-ACNC: <0.2 AL
ENA SS-A AB SER IA-ACNC: <0.2 AL
ENA SS-B AB SER IA-ACNC: <0.2 AL
ERYTHROCYTE [SEDIMENTATION RATE] IN BLOOD BY WESTERGREN METHOD: 9 MM/HR
GLUCOSE SERPL-MCNC: 86 MG/DL
HCT VFR BLD CALC: 38.4 %
HGB BLD-MCNC: 12.1 G/DL
MCHC RBC-ENTMCNC: 27.6 PG
MCHC RBC-ENTMCNC: 31.5 G/DL
MCV RBC AUTO: 87.5 FL
PLATELET # BLD AUTO: 367 K/UL
POTASSIUM SERPL-SCNC: 4.4 MMOL/L
PROT SERPL-MCNC: 6.9 G/DL
RBC # BLD: 4.39 M/UL
RBC # FLD: 12.9 %
RF+CCP IGG SER-IMP: NEGATIVE
RHEUMATOID FACT SER QL: <10 IU/ML
SODIUM SERPL-SCNC: 140 MMOL/L
THYROGLOB AB SERPL-ACNC: 17.9 IU/ML
THYROPEROXIDASE AB SERPL IA-ACNC: 11.8 IU/ML
WBC # FLD AUTO: 8.12 K/UL

## 2024-12-07 ENCOUNTER — NON-APPOINTMENT (OUTPATIENT)
Age: 19
End: 2024-12-07

## 2024-12-10 ENCOUNTER — APPOINTMENT (OUTPATIENT)
Dept: PEDIATRIC CARDIOLOGY | Facility: CLINIC | Age: 19
End: 2024-12-10
Payer: COMMERCIAL

## 2024-12-10 VITALS
SYSTOLIC BLOOD PRESSURE: 131 MMHG | WEIGHT: 145.51 LBS | BODY MASS INDEX: 25.78 KG/M2 | DIASTOLIC BLOOD PRESSURE: 80 MMHG | HEART RATE: 79 BPM | OXYGEN SATURATION: 99 % | HEIGHT: 62.99 IN | RESPIRATION RATE: 20 BRPM

## 2024-12-10 VITALS — HEART RATE: 80 BPM | SYSTOLIC BLOOD PRESSURE: 123 MMHG | DIASTOLIC BLOOD PRESSURE: 77 MMHG

## 2024-12-10 VITALS — SYSTOLIC BLOOD PRESSURE: 133 MMHG | DIASTOLIC BLOOD PRESSURE: 75 MMHG | HEART RATE: 83 BPM

## 2024-12-10 DIAGNOSIS — R07.89 OTHER CHEST PAIN: ICD-10-CM

## 2024-12-10 DIAGNOSIS — Q25.79 OTHER CONGENITAL MALFORMATIONS OF PULMONARY ARTERY: ICD-10-CM

## 2024-12-10 DIAGNOSIS — R06.02 SHORTNESS OF BREATH: ICD-10-CM

## 2024-12-10 DIAGNOSIS — R00.2 PALPITATIONS: ICD-10-CM

## 2024-12-10 DIAGNOSIS — R42 DIZZINESS AND GIDDINESS: ICD-10-CM

## 2024-12-10 PROCEDURE — 93303 ECHO TRANSTHORACIC: CPT

## 2024-12-10 PROCEDURE — 99215 OFFICE O/P EST HI 40 MIN: CPT | Mod: 25

## 2024-12-10 PROCEDURE — 93325 DOPPLER ECHO COLOR FLOW MAPG: CPT

## 2024-12-10 PROCEDURE — 93320 DOPPLER ECHO COMPLETE: CPT

## 2024-12-10 PROCEDURE — 93000 ELECTROCARDIOGRAM COMPLETE: CPT

## 2024-12-10 RX ORDER — FLUTICASONE FUROATE AND VILANTEROL TRIFENATATE 50; 25 UG/1; UG/1
POWDER RESPIRATORY (INHALATION)
Refills: 0 | Status: ACTIVE | COMMUNITY

## 2024-12-12 RX ORDER — 0.9 % SODIUM CHLORIDE 0.9 %
1000 INTRAVENOUS SOLUTION INTRAVENOUS
Refills: 0 | Status: DISCONTINUED | OUTPATIENT
Start: 2024-12-13 | End: 2024-12-13

## 2024-12-12 RX ORDER — ONDANSETRON HYDROCHLORIDE 4 MG/1
4 TABLET, FILM COATED ORAL ONCE
Refills: 0 | Status: DISCONTINUED | OUTPATIENT
Start: 2024-12-13 | End: 2024-12-13

## 2024-12-12 RX ORDER — MONTELUKAST SODIUM 10 MG/1
0 TABLET ORAL
Refills: 0 | DISCHARGE

## 2024-12-12 RX ORDER — FENTANYL 12 UG/H
50 PATCH, EXTENDED RELEASE TRANSDERMAL
Refills: 0 | Status: DISCONTINUED | OUTPATIENT
Start: 2024-12-13 | End: 2024-12-13

## 2024-12-13 ENCOUNTER — APPOINTMENT (OUTPATIENT)
Dept: ORTHOPEDIC SURGERY | Facility: HOSPITAL | Age: 19
End: 2024-12-13

## 2024-12-13 ENCOUNTER — OUTPATIENT (OUTPATIENT)
Dept: INPATIENT UNIT | Facility: HOSPITAL | Age: 19
LOS: 1 days | Discharge: HOME | End: 2024-12-13
Payer: COMMERCIAL

## 2024-12-13 ENCOUNTER — TRANSCRIPTION ENCOUNTER (OUTPATIENT)
Age: 19
End: 2024-12-13

## 2024-12-13 VITALS
OXYGEN SATURATION: 99 % | RESPIRATION RATE: 14 BRPM | SYSTOLIC BLOOD PRESSURE: 129 MMHG | HEART RATE: 90 BPM | DIASTOLIC BLOOD PRESSURE: 83 MMHG

## 2024-12-13 VITALS
WEIGHT: 139.99 LBS | DIASTOLIC BLOOD PRESSURE: 80 MMHG | HEIGHT: 63 IN | SYSTOLIC BLOOD PRESSURE: 131 MMHG | TEMPERATURE: 98 F | RESPIRATION RATE: 15 BRPM | OXYGEN SATURATION: 100 % | HEART RATE: 86 BPM

## 2024-12-13 DIAGNOSIS — M25.851 OTHER SPECIFIED JOINT DISORDERS, RIGHT HIP: ICD-10-CM

## 2024-12-13 DIAGNOSIS — S76.011A STRAIN OF MUSCLE, FASCIA AND TENDON OF RIGHT HIP, INITIAL ENCOUNTER: ICD-10-CM

## 2024-12-13 LAB — HCG UR QL: NEGATIVE — SIGNIFICANT CHANGE UP

## 2024-12-13 PROCEDURE — C1713: CPT

## 2024-12-13 PROCEDURE — 29999 UNLISTED PX ARTHROSCOPY: CPT | Mod: RT

## 2024-12-13 PROCEDURE — 81025 URINE PREGNANCY TEST: CPT

## 2024-12-13 PROCEDURE — 76000 FLUOROSCOPY <1 HR PHYS/QHP: CPT

## 2024-12-13 PROCEDURE — 29914 HIP ARTHRO W/FEMOROPLASTY: CPT | Mod: RT

## 2024-12-13 PROCEDURE — C9399: CPT

## 2024-12-13 PROCEDURE — 29916 HIP ARTHRO W/LABRAL REPAIR: CPT | Mod: RT

## 2024-12-13 RX ORDER — ACETAMINOPHEN 500MG 500 MG/1
2 TABLET, COATED ORAL
Qty: 30 | Refills: 0
Start: 2024-12-13 | End: 2024-12-17

## 2024-12-13 RX ORDER — INDOMETHACIN 75 MG/1
1 CAPSULE, EXTENDED RELEASE ORAL
Qty: 42 | Refills: 0
Start: 2024-12-13 | End: 2024-12-26

## 2024-12-13 RX ORDER — FEXOFENADINE HCL 60 MG
0 CAPSULE ORAL
Refills: 0 | DISCHARGE

## 2024-12-13 RX ORDER — ONDANSETRON HYDROCHLORIDE 4 MG/1
1 TABLET, FILM COATED ORAL
Qty: 12 | Refills: 0
Start: 2024-12-13

## 2024-12-13 RX ORDER — OXYCODONE HYDROCHLORIDE 30 MG/1
5 TABLET ORAL ONCE
Refills: 0 | Status: DISCONTINUED | OUTPATIENT
Start: 2024-12-13 | End: 2024-12-13

## 2024-12-13 RX ORDER — OLOPATADINE HYDROCHLORIDE AND MOMETASONE FUROATE 25; 665 UG/1; UG/1
2 SPRAY, METERED NASAL
Refills: 0 | DISCHARGE

## 2024-12-13 RX ORDER — SENNOSIDES 8.6 MG
1 TABLET ORAL
Qty: 5 | Refills: 0
Start: 2024-12-13 | End: 2024-12-17

## 2024-12-13 RX ORDER — PANTOPRAZOLE SODIUM 40 MG/1
1 TABLET, DELAYED RELEASE ORAL
Refills: 0 | DISCHARGE

## 2024-12-13 RX ORDER — CYCLOBENZAPRINE HCL 10 MG
1 TABLET ORAL
Qty: 21 | Refills: 0
Start: 2024-12-13 | End: 2024-12-19

## 2024-12-13 RX ORDER — FLUTICASONE FUROATE AND VILANTEROL 100; 25 UG/1; UG/1
1 POWDER RESPIRATORY (INHALATION)
Refills: 0 | DISCHARGE

## 2024-12-13 RX ORDER — NALOXONE HCL 0.4 MG/ML
1 AMPUL (ML) INJECTION
Qty: 1 | Refills: 0
Start: 2024-12-13 | End: 2024-12-13

## 2024-12-13 RX ORDER — CELECOXIB 200 MG/1
1 CAPSULE ORAL
Refills: 0 | DISCHARGE

## 2024-12-13 RX ORDER — DUPILUMAB 200 MG/1.14ML
300 INJECTION, SOLUTION SUBCUTANEOUS
Refills: 0 | DISCHARGE

## 2024-12-13 RX ORDER — OXYCODONE HYDROCHLORIDE 30 MG/1
1 TABLET ORAL
Qty: 20 | Refills: 0
Start: 2024-12-13 | End: 2024-12-17

## 2024-12-13 RX ADMIN — OXYCODONE HYDROCHLORIDE 5 MILLIGRAM(S): 30 TABLET ORAL at 09:46

## 2024-12-13 RX ADMIN — OXYCODONE HYDROCHLORIDE 5 MILLIGRAM(S): 30 TABLET ORAL at 10:15

## 2024-12-13 RX ADMIN — FENTANYL 50 MICROGRAM(S): 12 PATCH, EXTENDED RELEASE TRANSDERMAL at 09:41

## 2024-12-13 RX ADMIN — FENTANYL 50 MICROGRAM(S): 12 PATCH, EXTENDED RELEASE TRANSDERMAL at 10:15

## 2024-12-13 NOTE — ASU PATIENT PROFILE, ADULT - FALL HARM RISK - UNIVERSAL INTERVENTIONS
Bed in lowest position, wheels locked, appropriate side rails in place/Call bell, personal items and telephone in reach/Instruct patient to call for assistance before getting out of bed or chair/Non-slip footwear when patient is out of bed/[place] to call system/Physically safe environment - no spills, clutter or unnecessary equipment/Purposeful Proactive Rounding/Room/bathroom lighting operational, light cord in reach

## 2024-12-13 NOTE — ASU DISCHARGE PLAN (ADULT/PEDIATRIC) - CARE PROVIDER_API CALL
Camilo Reeves  Orthopaedic Sports Medicine  [address], [address]  [place] [address]  Phone: [phone]  Fax: [phone]  Follow Up Time: 2 weeks

## 2024-12-13 NOTE — ASU DISCHARGE PLAN (ADULT/PEDIATRIC) - ASU DC SPECIAL INSTRUCTIONSFT
Please refer to Dr. Reeves's instruction sheet regarding activity restricitons and medications to take after surgery

## 2024-12-13 NOTE — ASU DISCHARGE PLAN (ADULT/PEDIATRIC) - FINANCIAL ASSISTANCE
[facility] provides services at a reduced cost to those who are determined to be eligible through [facility]’s financial assistance program. Information regarding [facility]’s financial assistance program can be found by going to [email or web address] or by calling [phone].

## 2024-12-13 NOTE — ASU DISCHARGE PLAN (ADULT/PEDIATRIC) - NS MD DC FALL RISK RISK
For information on Fall & Injury Prevention, visit: [email or web address] OR  [email or web address] OR  https://www.cdc.gov/steadi/patient.html

## 2024-12-13 NOTE — ASU DISCHARGE PLAN (ADULT/PEDIATRIC) - NURSING INSTRUCTIONS
For any problems or concerns,contact your doctor. [facility] patients should call the [facility]. If you cannot reach the doctor or clinic, call [facility] Emergency Department at [phone] or go to your local Emergency Department.  A responsible adult should be with you for the rest of the day and night for your safety and to help you if you needed. Resume your medications as listed on the attached Medication Record. Begin with liquids and light food ( tea, toast, Jello, soups). Advance to what you normally eat. Liquids should taken in adequate amounts today.     CALL the DOCTOR:    -Fever greater than  101F  - Signs  of infection such as : increase pain,swelling,redness,or a bad  smell coming from the wound.  -Excessive amount of bleeding.  - Any pain that appears to be getting worse.  - Vomiting  -  If you have  not urinated 8 hours after surgery or have any difficulty urinating.     A responsible adult should be with you for the rest of the day and night for your safety and to help you if you needed.    Review attached FACT SHEET if applicable.

## 2024-12-19 ENCOUNTER — APPOINTMENT (OUTPATIENT)
Dept: ORTHOPEDIC SURGERY | Facility: CLINIC | Age: 19
End: 2024-12-19
Payer: COMMERCIAL

## 2024-12-19 DIAGNOSIS — S73.191D OTHER SPRAIN OF RIGHT HIP, SUBSEQUENT ENCOUNTER: ICD-10-CM

## 2024-12-19 DIAGNOSIS — E11.9 TYPE 2 DIABETES MELLITUS WITHOUT COMPLICATIONS: ICD-10-CM

## 2024-12-19 DIAGNOSIS — M25.851 OTHER SPECIFIED JOINT DISORDERS, RIGHT HIP: ICD-10-CM

## 2024-12-19 DIAGNOSIS — S73.191A OTHER SPRAIN OF RIGHT HIP, INITIAL ENCOUNTER: ICD-10-CM

## 2024-12-19 PROCEDURE — 73502 X-RAY EXAM HIP UNI 2-3 VIEWS: CPT

## 2024-12-19 PROCEDURE — 99024 POSTOP FOLLOW-UP VISIT: CPT

## 2024-12-23 ENCOUNTER — NON-APPOINTMENT (OUTPATIENT)
Age: 19
End: 2024-12-23

## 2025-01-15 ENCOUNTER — APPOINTMENT (OUTPATIENT)
Dept: ORTHOPEDIC SURGERY | Facility: CLINIC | Age: 20
End: 2025-01-15
Payer: COMMERCIAL

## 2025-01-15 DIAGNOSIS — M75.82 OTHER SHOULDER LESIONS, LEFT SHOULDER: ICD-10-CM

## 2025-01-15 DIAGNOSIS — G25.89 OTHER SPECIFIED EXTRAPYRAMIDAL AND MOVEMENT DISORDERS: ICD-10-CM

## 2025-01-15 DIAGNOSIS — S46.812A STRAIN OF OTHER MUSCLES, FASCIA AND TENDONS AT SHOULDER AND UPPER ARM LEVEL, LEFT ARM, INITIAL ENCOUNTER: ICD-10-CM

## 2025-01-15 DIAGNOSIS — S73.191D OTHER SPRAIN OF RIGHT HIP, SUBSEQUENT ENCOUNTER: ICD-10-CM

## 2025-01-15 PROCEDURE — 99024 POSTOP FOLLOW-UP VISIT: CPT

## 2025-01-17 ENCOUNTER — NON-APPOINTMENT (OUTPATIENT)
Age: 20
End: 2025-01-17

## 2025-02-24 ENCOUNTER — APPOINTMENT (OUTPATIENT)
Dept: ORTHOPEDIC SURGERY | Facility: CLINIC | Age: 20
End: 2025-02-24
Payer: COMMERCIAL

## 2025-02-24 DIAGNOSIS — S73.191D OTHER SPRAIN OF RIGHT HIP, SUBSEQUENT ENCOUNTER: ICD-10-CM

## 2025-02-24 DIAGNOSIS — M25.851 OTHER SPECIFIED JOINT DISORDERS, RIGHT HIP: ICD-10-CM

## 2025-02-24 PROCEDURE — 99024 POSTOP FOLLOW-UP VISIT: CPT

## 2025-02-26 ENCOUNTER — NON-APPOINTMENT (OUTPATIENT)
Age: 20
End: 2025-02-26

## 2025-03-31 ENCOUNTER — APPOINTMENT (OUTPATIENT)
Dept: RHEUMATOLOGY | Facility: CLINIC | Age: 20
End: 2025-03-31
Payer: COMMERCIAL

## 2025-03-31 VITALS
SYSTOLIC BLOOD PRESSURE: 120 MMHG | TEMPERATURE: 96.9 F | HEART RATE: 75 BPM | DIASTOLIC BLOOD PRESSURE: 79 MMHG | OXYGEN SATURATION: 99 % | WEIGHT: 140 LBS | HEIGHT: 63 IN | BODY MASS INDEX: 24.8 KG/M2

## 2025-03-31 DIAGNOSIS — L50.9 URTICARIA, UNSPECIFIED: ICD-10-CM

## 2025-03-31 DIAGNOSIS — R00.2 PALPITATIONS: ICD-10-CM

## 2025-03-31 DIAGNOSIS — Z91.09 OTHER ALLERGY STATUS, OTHER THAN TO DRUGS AND BIOLOGICAL SUBSTANCES: ICD-10-CM

## 2025-03-31 DIAGNOSIS — G25.89 OTHER SPECIFIED EXTRAPYRAMIDAL AND MOVEMENT DISORDERS: ICD-10-CM

## 2025-03-31 DIAGNOSIS — Z86.39 PERSONAL HISTORY OF OTHER ENDOCRINE, NUTRITIONAL AND METABOLIC DISEASE: ICD-10-CM

## 2025-03-31 DIAGNOSIS — M51.26 OTHER INTERVERTEBRAL DISC DISPLACEMENT, LUMBAR REGION: ICD-10-CM

## 2025-03-31 DIAGNOSIS — M21.859 OTHER SPECIFIED ACQUIRED DEFORMITIES OF UNSPECIFIED THIGH: ICD-10-CM

## 2025-03-31 DIAGNOSIS — Q25.79 OTHER CONGENITAL MALFORMATIONS OF PULMONARY ARTERY: ICD-10-CM

## 2025-03-31 DIAGNOSIS — Q79.62 HYPERMOBILE EHLERS-DANLOS SYNDROME: ICD-10-CM

## 2025-03-31 DIAGNOSIS — M94.0 CHONDROCOSTAL JUNCTION SYNDROME [TIETZE]: ICD-10-CM

## 2025-03-31 DIAGNOSIS — L30.9 DERMATITIS, UNSPECIFIED: ICD-10-CM

## 2025-03-31 DIAGNOSIS — N83.53 TORSION OF OVARY, OVARIAN PEDICLE AND FALLOPIAN TUBE: ICD-10-CM

## 2025-03-31 DIAGNOSIS — M25.851 OTHER SPECIFIED JOINT DISORDERS, RIGHT HIP: ICD-10-CM

## 2025-03-31 DIAGNOSIS — R42 DIZZINESS AND GIDDINESS: ICD-10-CM

## 2025-03-31 PROCEDURE — 99215 OFFICE O/P EST HI 40 MIN: CPT

## 2025-03-31 PROCEDURE — 36415 COLL VENOUS BLD VENIPUNCTURE: CPT

## 2025-03-31 PROCEDURE — 99417 PROLNG OP E/M EACH 15 MIN: CPT

## 2025-03-31 PROCEDURE — G2211 COMPLEX E/M VISIT ADD ON: CPT | Mod: NC

## 2025-03-31 RX ORDER — FLUTICASONE FUROATE AND VILANTEROL TRIFENATATE 200; 25 UG/1; UG/1
200-25 POWDER RESPIRATORY (INHALATION)
Refills: 0 | Status: ACTIVE | COMMUNITY

## 2025-03-31 RX ORDER — METHYLDOPA/HYDROCHLOROTHIAZIDE 250MG-15MG
TABLET ORAL
Refills: 0 | Status: ACTIVE | COMMUNITY

## 2025-03-31 RX ORDER — OLOPATADINE HYDROCHLORIDE AND MOMETASONE FUROATE 25; 665 UG/1; UG/1
665-25 SPRAY, METERED NASAL
Refills: 0 | Status: ACTIVE | COMMUNITY

## 2025-04-01 LAB
TOTAL IGE SMQN RAST: 49 KU/L
TRYPTASE: 4 UG/L

## 2025-04-07 ENCOUNTER — APPOINTMENT (OUTPATIENT)
Dept: ORTHOPEDIC SURGERY | Facility: CLINIC | Age: 20
End: 2025-04-07
Payer: COMMERCIAL

## 2025-04-07 DIAGNOSIS — M25.50 PAIN IN UNSPECIFIED JOINT: ICD-10-CM

## 2025-04-07 DIAGNOSIS — S73.191D OTHER SPRAIN OF RIGHT HIP, SUBSEQUENT ENCOUNTER: ICD-10-CM

## 2025-04-07 PROCEDURE — 99214 OFFICE O/P EST MOD 30 MIN: CPT

## 2025-04-08 LAB
2,3-DINOR-11B-PROSTAGLANDIN F2A, RANDOM URINE: 454 PG/MG CR
CREATININE RANDOM URINE: 101 MG/DL

## 2025-04-09 LAB
CREATININE RANDOM URINE: 101
CREATININE RANDOM URINE: 99 MG/DL
HVA/CREAT UR: 3.1
LEUKOTRIENE E4, URINE: 32
ME-HISTAMINE/CREAT UR: 112 MCG/G CR

## 2025-05-12 ENCOUNTER — NON-APPOINTMENT (OUTPATIENT)
Age: 20
End: 2025-05-12

## 2025-06-12 ENCOUNTER — APPOINTMENT (OUTPATIENT)
Dept: ORTHOPEDIC SURGERY | Facility: CLINIC | Age: 20
End: 2025-06-12
Payer: COMMERCIAL

## 2025-06-12 PROBLEM — Z98.890 S/P HIP ARTHROSCOPY: Status: ACTIVE | Noted: 2025-06-12

## 2025-06-12 PROCEDURE — 99214 OFFICE O/P EST MOD 30 MIN: CPT

## 2025-07-09 ENCOUNTER — TRANSCRIPTION ENCOUNTER (OUTPATIENT)
Age: 20
End: 2025-07-09

## 2025-07-09 ENCOUNTER — APPOINTMENT (OUTPATIENT)
Dept: RHEUMATOLOGY | Facility: CLINIC | Age: 20
End: 2025-07-09
Payer: COMMERCIAL

## 2025-07-09 VITALS
OXYGEN SATURATION: 99 % | TEMPERATURE: 98.1 F | SYSTOLIC BLOOD PRESSURE: 128 MMHG | DIASTOLIC BLOOD PRESSURE: 80 MMHG | HEART RATE: 90 BPM | BODY MASS INDEX: 24.8 KG/M2 | HEIGHT: 63 IN | WEIGHT: 140 LBS

## 2025-07-09 PROBLEM — M54.2 CERVICALGIA: Status: ACTIVE | Noted: 2025-07-09

## 2025-07-09 PROBLEM — M25.512 CHRONIC LEFT SHOULDER PAIN: Status: ACTIVE | Noted: 2025-07-09

## 2025-07-09 PROCEDURE — G2211 COMPLEX E/M VISIT ADD ON: CPT | Mod: NC

## 2025-07-09 PROCEDURE — 99215 OFFICE O/P EST HI 40 MIN: CPT

## 2025-07-09 RX ORDER — DROSPIRENONE AND ETHINYL ESTRADIOL 0.03MG-3MG
3-0.03 KIT ORAL
Refills: 0 | Status: ACTIVE | COMMUNITY

## 2025-08-19 ENCOUNTER — APPOINTMENT (OUTPATIENT)
Dept: ORTHOPEDIC SURGERY | Facility: CLINIC | Age: 20
End: 2025-08-19

## 2025-09-02 ENCOUNTER — APPOINTMENT (OUTPATIENT)
Dept: ORTHOPEDIC SURGERY | Facility: CLINIC | Age: 20
End: 2025-09-02
Payer: COMMERCIAL

## 2025-09-02 DIAGNOSIS — M76.891 OTHER SPECIFIED ENTHESOPATHIES OF RIGHT LOWER LIMB, EXCLUDING FOOT: ICD-10-CM

## 2025-09-02 DIAGNOSIS — Z98.890 OTHER SPECIFIED POSTPROCEDURAL STATES: ICD-10-CM

## 2025-09-02 PROCEDURE — 99214 OFFICE O/P EST MOD 30 MIN: CPT
